# Patient Record
Sex: MALE | Race: WHITE | NOT HISPANIC OR LATINO | Employment: FULL TIME | URBAN - METROPOLITAN AREA
[De-identification: names, ages, dates, MRNs, and addresses within clinical notes are randomized per-mention and may not be internally consistent; named-entity substitution may affect disease eponyms.]

---

## 2017-02-09 ENCOUNTER — HOSPITAL ENCOUNTER (EMERGENCY)
Facility: HOSPITAL | Age: 27
Discharge: HOME | End: 2017-02-09
Attending: EMERGENCY MEDICINE | Admitting: EMERGENCY MEDICINE

## 2017-02-09 ENCOUNTER — APPOINTMENT (OUTPATIENT)
Dept: CT IMAGING | Facility: HOSPITAL | Age: 27
End: 2017-02-09

## 2017-02-09 VITALS
HEART RATE: 85 BPM | HEIGHT: 73 IN | TEMPERATURE: 96.6 F | RESPIRATION RATE: 16 BRPM | WEIGHT: 150 LBS | OXYGEN SATURATION: 100 % | DIASTOLIC BLOOD PRESSURE: 99 MMHG | BODY MASS INDEX: 19.88 KG/M2 | SYSTOLIC BLOOD PRESSURE: 152 MMHG

## 2017-02-09 DIAGNOSIS — N20.1 LEFT URETERAL STONE: Primary | ICD-10-CM

## 2017-02-09 DIAGNOSIS — R73.9 HYPERGLYCEMIA: ICD-10-CM

## 2017-02-09 LAB
ALBUMIN SERPL-MCNC: 4.7 G/DL (ref 3.5–5.2)
ALBUMIN/GLOB SERPL: 1.9 G/DL
ALP SERPL-CCNC: 49 U/L (ref 39–117)
ALT SERPL W P-5'-P-CCNC: 17 U/L (ref 1–41)
ANION GAP SERPL CALCULATED.3IONS-SCNC: 13.9 MMOL/L
AST SERPL-CCNC: 17 U/L (ref 1–40)
BACTERIA UR QL AUTO: ABNORMAL /HPF
BASOPHILS # BLD AUTO: 0.03 10*3/MM3 (ref 0–0.2)
BASOPHILS NFR BLD AUTO: 0.2 % (ref 0–1.5)
BILIRUB SERPL-MCNC: 0.6 MG/DL (ref 0.1–1.2)
BILIRUB UR QL STRIP: NEGATIVE
BUN BLD-MCNC: 13 MG/DL (ref 6–20)
BUN/CREAT SERPL: 12.6 (ref 7–25)
CALCIUM SPEC-SCNC: 9.4 MG/DL (ref 8.6–10.5)
CHLORIDE SERPL-SCNC: 101 MMOL/L (ref 98–107)
CLARITY UR: ABNORMAL
CO2 SERPL-SCNC: 25.1 MMOL/L (ref 22–29)
COD CRY URNS QL: ABNORMAL /HPF
COLOR UR: ABNORMAL
CREAT BLD-MCNC: 1.03 MG/DL (ref 0.76–1.27)
DEPRECATED RDW RBC AUTO: 41.9 FL (ref 37–54)
EOSINOPHIL # BLD AUTO: 0.08 10*3/MM3 (ref 0–0.7)
EOSINOPHIL NFR BLD AUTO: 0.6 % (ref 0.3–6.2)
ERYTHROCYTE [DISTWIDTH] IN BLOOD BY AUTOMATED COUNT: 12.9 % (ref 11.5–14.5)
GFR SERPL CREATININE-BSD FRML MDRD: 88 ML/MIN/1.73
GLOBULIN UR ELPH-MCNC: 2.5 GM/DL
GLUCOSE BLD-MCNC: 175 MG/DL (ref 65–99)
GLUCOSE UR STRIP-MCNC: NEGATIVE MG/DL
HCT VFR BLD AUTO: 43.8 % (ref 40.4–52.2)
HGB BLD-MCNC: 15.3 G/DL (ref 13.7–17.6)
HGB UR QL STRIP.AUTO: ABNORMAL
HOLD SPECIMEN: NORMAL
HOLD SPECIMEN: NORMAL
HYALINE CASTS UR QL AUTO: ABNORMAL /LPF
IMM GRANULOCYTES # BLD: 0.05 10*3/MM3 (ref 0–0.03)
IMM GRANULOCYTES NFR BLD: 0.4 % (ref 0–0.5)
KETONES UR QL STRIP: ABNORMAL
LEUKOCYTE ESTERASE UR QL STRIP.AUTO: NEGATIVE
LIPASE SERPL-CCNC: 19 U/L (ref 13–60)
LYMPHOCYTES # BLD AUTO: 2.37 10*3/MM3 (ref 0.9–4.8)
LYMPHOCYTES NFR BLD AUTO: 18.2 % (ref 19.6–45.3)
MCH RBC QN AUTO: 31 PG (ref 27–32.7)
MCHC RBC AUTO-ENTMCNC: 34.9 G/DL (ref 32.6–36.4)
MCV RBC AUTO: 88.8 FL (ref 79.8–96.2)
MONOCYTES # BLD AUTO: 0.92 10*3/MM3 (ref 0.2–1.2)
MONOCYTES NFR BLD AUTO: 7.1 % (ref 5–12)
MUCOUS THREADS URNS QL MICRO: ABNORMAL /HPF
NEUTROPHILS # BLD AUTO: 9.57 10*3/MM3 (ref 1.9–8.1)
NEUTROPHILS NFR BLD AUTO: 73.5 % (ref 42.7–76)
NITRITE UR QL STRIP: NEGATIVE
PH UR STRIP.AUTO: 6 [PH] (ref 5–8)
PLATELET # BLD AUTO: 178 10*3/MM3 (ref 140–500)
PMV BLD AUTO: 10.1 FL (ref 6–12)
POTASSIUM BLD-SCNC: 3.8 MMOL/L (ref 3.5–5.2)
PROT SERPL-MCNC: 7.2 G/DL (ref 6–8.5)
PROT UR QL STRIP: ABNORMAL
RBC # BLD AUTO: 4.93 10*6/MM3 (ref 4.6–6)
RBC # UR: ABNORMAL /HPF
REF LAB TEST METHOD: ABNORMAL
SODIUM BLD-SCNC: 140 MMOL/L (ref 136–145)
SP GR UR STRIP: >=1.03 (ref 1–1.03)
SQUAMOUS #/AREA URNS HPF: ABNORMAL /HPF
UROBILINOGEN UR QL STRIP: ABNORMAL
WBC NRBC COR # BLD: 13.02 10*3/MM3 (ref 4.5–10.7)
WBC UR QL AUTO: ABNORMAL /HPF
WHOLE BLOOD HOLD SPECIMEN: NORMAL
WHOLE BLOOD HOLD SPECIMEN: NORMAL

## 2017-02-09 PROCEDURE — 25010000002 ONDANSETRON PER 1 MG: Performed by: EMERGENCY MEDICINE

## 2017-02-09 PROCEDURE — 96361 HYDRATE IV INFUSION ADD-ON: CPT

## 2017-02-09 PROCEDURE — 87086 URINE CULTURE/COLONY COUNT: CPT | Performed by: EMERGENCY MEDICINE

## 2017-02-09 PROCEDURE — 0 IOPAMIDOL 61 % SOLUTION: Performed by: EMERGENCY MEDICINE

## 2017-02-09 PROCEDURE — 96375 TX/PRO/DX INJ NEW DRUG ADDON: CPT

## 2017-02-09 PROCEDURE — 81001 URINALYSIS AUTO W/SCOPE: CPT | Performed by: EMERGENCY MEDICINE

## 2017-02-09 PROCEDURE — 25010000002 MORPHINE PER 10 MG: Performed by: EMERGENCY MEDICINE

## 2017-02-09 PROCEDURE — 83690 ASSAY OF LIPASE: CPT | Performed by: EMERGENCY MEDICINE

## 2017-02-09 PROCEDURE — 85025 COMPLETE CBC W/AUTO DIFF WBC: CPT | Performed by: EMERGENCY MEDICINE

## 2017-02-09 PROCEDURE — 96374 THER/PROPH/DIAG INJ IV PUSH: CPT

## 2017-02-09 PROCEDURE — 74177 CT ABD & PELVIS W/CONTRAST: CPT

## 2017-02-09 PROCEDURE — 80053 COMPREHEN METABOLIC PANEL: CPT | Performed by: EMERGENCY MEDICINE

## 2017-02-09 PROCEDURE — 99283 EMERGENCY DEPT VISIT LOW MDM: CPT

## 2017-02-09 RX ORDER — OXYCODONE HYDROCHLORIDE AND ACETAMINOPHEN 5; 325 MG/1; MG/1
1 TABLET ORAL EVERY 4 HOURS PRN
Qty: 20 TABLET | Refills: 0 | Status: SHIPPED | OUTPATIENT
Start: 2017-02-09 | End: 2020-07-04

## 2017-02-09 RX ORDER — ONDANSETRON 2 MG/ML
4 INJECTION INTRAMUSCULAR; INTRAVENOUS ONCE
Status: COMPLETED | OUTPATIENT
Start: 2017-02-09 | End: 2017-02-09

## 2017-02-09 RX ORDER — SODIUM CHLORIDE 0.9 % (FLUSH) 0.9 %
10 SYRINGE (ML) INJECTION AS NEEDED
Status: DISCONTINUED | OUTPATIENT
Start: 2017-02-09 | End: 2017-02-09 | Stop reason: HOSPADM

## 2017-02-09 RX ORDER — ONDANSETRON 4 MG/1
4 TABLET, FILM COATED ORAL EVERY 6 HOURS
Qty: 12 TABLET | Refills: 0 | Status: SHIPPED | OUTPATIENT
Start: 2017-02-09 | End: 2020-07-04

## 2017-02-09 RX ORDER — TAMSULOSIN HYDROCHLORIDE 0.4 MG/1
1 CAPSULE ORAL NIGHTLY
Qty: 5 CAPSULE | Refills: 0 | Status: SHIPPED | OUTPATIENT
Start: 2017-02-09 | End: 2020-07-04

## 2017-02-09 RX ADMIN — ONDANSETRON 4 MG: 2 INJECTION INTRAMUSCULAR; INTRAVENOUS at 10:05

## 2017-02-09 RX ADMIN — IOPAMIDOL 85 ML: 612 INJECTION, SOLUTION INTRAVENOUS at 10:33

## 2017-02-09 RX ADMIN — MORPHINE SULFATE 4 MG: 4 INJECTION, SOLUTION INTRAMUSCULAR; INTRAVENOUS at 10:06

## 2017-02-09 RX ADMIN — SODIUM CHLORIDE 1000 ML: 9 INJECTION, SOLUTION INTRAVENOUS at 10:04

## 2017-02-09 NOTE — ED NOTES
Patient reports that he awoke this morning with sharp left lower abdomen.  Patient has vomited three times and is now dry heaving.  Denies  Dysuria.       Leslie Escobar RN  02/09/17 3007

## 2017-02-10 LAB — BACTERIA SPEC AEROBE CULT: NO GROWTH

## 2017-11-27 NOTE — ED PROVIDER NOTES
EMERGENCY DEPARTMENT ENCOUNTER    CHIEF COMPLAINT  Chief Complaint: abd pain  History given by: patient  History limited by: nothing  Room Number: 30/30  PMD: No Known Provider      HPI:  Pt is a 26 y.o. male who presents complaining of constant sharp L-sided abd pain that woke him from sleeping this morning. Pt c/o subjective fever, chills, vomiting (x3-4) but denies constipation, diarrhea, dysuria, back pain, sick contacts.    Duration:  Onset this morning  Onset: gradual  Timing: constant  Location: L sided abd  Radiation: none  Quality: sharp  Intensity/Severity: moderate  Progression: unchanged  Associated Symptoms: subjective fever, chills, vomiting  Aggravating Factors: none  Alleviating Factors: none  Previous Episodes: none  Treatment before arrival: none    PAST MEDICAL HISTORY  Active Ambulatory Problems     Diagnosis Date Noted   • No Active Ambulatory Problems     Resolved Ambulatory Problems     Diagnosis Date Noted   • No Resolved Ambulatory Problems     No Additional Past Medical History       PAST SURGICAL HISTORY  History reviewed. No pertinent past surgical history.    FAMILY HISTORY  History reviewed. No pertinent family history.    SOCIAL HISTORY  Social History     Social History   • Marital status: Single     Spouse name: N/A   • Number of children: N/A   • Years of education: N/A     Occupational History   • Not on file.     Social History Main Topics   • Smoking status: Current Every Day Smoker     Types: Cigarettes   • Smokeless tobacco: Not on file   • Alcohol use 0.6 oz/week     1 Cans of beer per week   • Drug use: No   • Sexual activity: Yes     Birth control/ protection: Condom     Other Topics Concern   • Not on file     Social History Narrative   • No narrative on file       ALLERGIES  Review of patient's allergies indicates no known allergies.    REVIEW OF SYSTEMS  Review of Systems   Constitutional: Positive for chills and fever (subjective).   HENT: Negative for congestion and  sore throat.    Eyes: Negative.    Respiratory: Negative for cough and shortness of breath.    Cardiovascular: Negative for chest pain and leg swelling.   Gastrointestinal: Positive for abdominal pain (L sided) and vomiting (x3-4). Negative for diarrhea.   Genitourinary: Negative for difficulty urinating and dysuria.   Musculoskeletal: Negative for back pain and neck pain.   Skin: Negative for rash and wound.   Allergic/Immunologic: Negative.    Neurological: Negative for dizziness, weakness, numbness and headaches.   Psychiatric/Behavioral: Negative.    All other systems reviewed and are negative.      PHYSICAL EXAM  ED Triage Vitals   Temp Heart Rate Resp BP SpO2   02/09/17 0833 02/09/17 0833 02/09/17 0833 02/09/17 0833 02/09/17 0833   96.6 °F (35.9 °C) 92 20 159/90 99 %      Temp src Heart Rate Source Patient Position BP Location FiO2 (%)   02/09/17 0833 02/09/17 0833 02/09/17 0833 02/09/17 0833 --   Tympanic Monitor Sitting Right arm        Physical Exam   Constitutional: He is oriented to person, place, and time and well-developed, well-nourished, and in no distress.   HENT:   Head: Normocephalic and atraumatic.   Eyes: EOM are normal. Pupils are equal, round, and reactive to light.   Neck: Normal range of motion. Neck supple.   Cardiovascular: Normal rate, regular rhythm and normal heart sounds.    Pulmonary/Chest: Effort normal and breath sounds normal. No respiratory distress.   Abdominal: Soft. There is tenderness (LLQ). There is no rebound, no guarding and no CVA tenderness.   Musculoskeletal: Normal range of motion. He exhibits no edema.   Neurological: He is alert and oriented to person, place, and time. He has normal sensation and normal strength.   Skin: Skin is warm and dry.   Psychiatric: Mood and affect normal.   Nursing note and vitals reviewed.      LAB RESULTS  Lab Results (last 24 hours)     Procedure Component Value Units Date/Time    CBC & Differential [[id number]] Collected:  02/09/17 0901     Specimen:  Blood Updated:  02/09/17 0915    Narrative:       The following orders were created for panel order CBC & Differential.  Procedure                               Abnormality         Status                     ---------                               -----------         ------                     CBC Auto Differential[[id number]]         Abnormal            Final result                 Please view results for these tests on the individual orders.    Comprehensive Metabolic Panel [[id number]]  (Abnormal) Collected:  02/09/17 0901    Specimen:  Blood Updated:  02/09/17 0934     Glucose 175 (H) mg/dL      BUN 13 mg/dL      Creatinine 1.03 mg/dL      Sodium 140 mmol/L      Potassium 3.8 mmol/L      Chloride 101 mmol/L      CO2 25.1 mmol/L      Calcium 9.4 mg/dL      Total Protein 7.2 g/dL      Albumin 4.70 g/dL      ALT (SGPT) 17 U/L      AST (SGOT) 17 U/L      Alkaline Phosphatase 49 U/L      Total Bilirubin 0.6 mg/dL      eGFR Non African Amer 88 mL/min/1.73      Globulin 2.5 gm/dL      A/G Ratio 1.9 g/dL      BUN/Creatinine Ratio 12.6      Anion Gap 13.9 mmol/L     Lipase [[id number]]  (Normal) Collected:  02/09/17 0901    Specimen:  Blood Updated:  02/09/17 0934     Lipase 19 U/L     CBC Auto Differential [[id number]]  (Abnormal) Collected:  02/09/17 0901    Specimen:  Blood Updated:  02/09/17 0915     WBC 13.02 (H) 10*3/mm3      RBC 4.93 10*6/mm3      Hemoglobin 15.3 g/dL      Hematocrit 43.8 %      MCV 88.8 fL      MCH 31.0 pg      MCHC 34.9 g/dL      RDW 12.9 %      RDW-SD 41.9 fl      MPV 10.1 fL      Platelets 178 10*3/mm3      Neutrophil % 73.5 %      Lymphocyte % 18.2 (L) %      Monocyte % 7.1 %      Eosinophil % 0.6 %      Basophil % 0.2 %      Immature Grans % 0.4 %      Neutrophils, Absolute 9.57 (H) 10*3/mm3      Lymphocytes, Absolute 2.37 10*3/mm3      Monocytes, Absolute 0.92 10*3/mm3      Eosinophils, Absolute 0.08 10*3/mm3      Basophils, Absolute 0.03 10*3/mm3      Immature Grans, Absolute 0.05 (H)  10*3/mm3     Urinalysis With / Culture If Indicated [[id number]]  (Abnormal) Collected:  02/09/17 0917    Specimen:  Urine from Urine, Clean Catch Updated:  02/09/17 1006     Color, UA Dark Yellow (A)      Appearance, UA Cloudy (A)      pH, UA 6.0      Specific Gravity, UA >=1.030      Glucose, UA Negative      Ketones, UA 15 mg/dL (1+) (A)      Bilirubin, UA Negative      Blood, UA Moderate (2+) (A)      Protein, UA 30 mg/dL (1+) (A)      Leuk Esterase, UA Negative      Nitrite, UA Negative      Urobilinogen, UA 1.0 E.U./dL     Urinalysis, Microscopic Only [[id number]]  (Abnormal) Collected:  02/09/17 0917    Specimen:  Urine from Urine, Clean Catch Updated:  02/09/17 1006     RBC, UA 3-5 (A) /HPF      WBC, UA 3-5 (A) /HPF      Bacteria, UA 1+ (A) /HPF      Squamous Epithelial Cells, UA 0-2 /HPF      Hyaline Casts, UA None Seen /LPF      Calcium Oxalate Crystals, UA Moderate/2+ /HPF      Mucus, UA Moderate/2+ (A) /HPF      Methodology Manual Light Microscopy     Urine Culture [[id number]] Collected:  02/09/17 0917    Specimen:  Urine from Urine, Clean Catch Updated:  02/09/17 0946        I ordered the above labs and reviewed the results    RADIOLOGY  CT Abdomen Pelvis With Contrast    (Results Pending)   CT abd/pel shows 3 mm L sided ureteral stone w/ moderate obstruction in distal L ureter.    I ordered the above noted radiological studies. Interpreted by radiologist. Discussed with radiologist (Dr. Romo). Reviewed by me in PACS.       PROCEDURES  Procedures      PROGRESS AND CONSULTS  ED Course     0950  Ordered CT abd/pel for further evaluation. Ordered morphine for pain and Zofran for nausea.    1052  Rechecked pt who is resting comfortably. D/w pt CT abd/pel that shows 3 mm stone in L ureter and plan to d/c w/ pain and nausea medication and f/u w/ urologist. Gave RTER warnings. Pt and family understand and agree with the plan. All questions answered.    MEDICAL DECISION MAKING  Results were reviewed/discussed  with the patient and they were also made aware of online access. Pt also made aware that some labs, such as cultures, will not be resulted during ER visit and follow up with PMD is necessary.     MDM  Number of Diagnoses or Management Options  Left ureteral stone:      Amount and/or Complexity of Data Reviewed  Clinical lab tests: ordered and reviewed (UA shows 3-5 RBC.)  Tests in the radiology section of CPT®: ordered and reviewed (CT abd/pel shows 3 mm L sided ureteral stone w/ moderate obstruction in distal L ureter.)  Decide to obtain previous medical records or to obtain history from someone other than the patient: yes  Review and summarize past medical records: yes  Discuss the patient with other providers: yes (Dr. Romo, radiology)  Independent visualization of images, tracings, or specimens: yes    Patient Progress  Patient progress: stable         DIAGNOSIS  Final diagnoses:   Left ureteral stone   Hyperglycemia       DISPOSITION  DISCHARGE    Patient discharged in stable condition.    Reviewed implications of results, diagnosis, meds, responsibility to follow up, warning signs and symptoms of possible worsening, potential complications and reasons to return to ER, including worsening abd or flank pain.    Patient/Family voiced understanding of above instructions.    Discussed plan for discharge, as there is no emergent indication for admission.  Pt/family is agreeable and understands need for follow up and repeat testing.  Pt is aware that discharge does not mean that nothing is wrong but it indicates no emergency is present that requires admission and they must continue care with follow-up as given below or physician of their choice.     FOLLOW-UP  Andrey Barker MD  [address] [place] [address] [place] [address]  [phone]    Call in 1 day  For follow-up    [facility]YS[facility] REFERR[facility]  [place] [id number] [phone]    blood sugar recheck       Medication List       New Prescriptions          ondansetron 4 MG tablet   Commonly known as:  ZOFRAN   Take 1 tablet by mouth Every 6 (Six) Hours.       oxyCODONE-acetaminophen 5-325 MG per tablet   Commonly known as:  PERCOCET   Take 1 tablet by mouth Every 4 (Four) Hours As Needed for severe pain   (7-10).       tamsulosin 0.4 MG capsule 24 hr capsule   Commonly known as:  FLOMAX   Take 1 capsule by mouth Every Night.         Latest Documented Vital Signs:  As of 11:25 AM  BP- 152/99 HR- 85 Temp- 96.6 °F (35.9 °C) (Tympanic) O2 sat- 100%    --  Documentation assistance provided by rao Brink for Dr. López.  Information recorded by the scribe was done at my direction and has been verified and validated by me.     Deondre Brink  02/09/17 3032       Scottie López MD  02/09/17 2331     Daily Assessment

## 2020-07-04 ENCOUNTER — HOSPITAL ENCOUNTER (EMERGENCY)
Facility: HOSPITAL | Age: 30
Discharge: HOME | End: 2020-07-04
Attending: EMERGENCY MEDICINE | Admitting: EMERGENCY MEDICINE

## 2020-07-04 VITALS
DIASTOLIC BLOOD PRESSURE: 92 MMHG | TEMPERATURE: 96.8 F | SYSTOLIC BLOOD PRESSURE: 147 MMHG | HEART RATE: 96 BPM | WEIGHT: 215 LBS | RESPIRATION RATE: 16 BRPM | BODY MASS INDEX: 28.49 KG/M2 | HEIGHT: 73 IN | OXYGEN SATURATION: 97 %

## 2020-07-04 DIAGNOSIS — E86.0 DEHYDRATION, MILD: ICD-10-CM

## 2020-07-04 DIAGNOSIS — S80.819A ABRASION OF LOWER EXTREMITY, UNSPECIFIED LATERALITY, INITIAL ENCOUNTER: ICD-10-CM

## 2020-07-04 DIAGNOSIS — S30.811A ABRASION OF FLANK, INITIAL ENCOUNTER: Primary | ICD-10-CM

## 2020-07-04 LAB
BILIRUB UR QL STRIP: NEGATIVE
CLARITY UR: CLEAR
COLOR UR: YELLOW
GLUCOSE UR STRIP-MCNC: NEGATIVE MG/DL
HGB UR QL STRIP.AUTO: NEGATIVE
KETONES UR QL STRIP: ABNORMAL
LEUKOCYTE ESTERASE UR QL STRIP.AUTO: NEGATIVE
NITRITE UR QL STRIP: NEGATIVE
PH UR STRIP.AUTO: 5.5 [PH] (ref 5–8)
PROT UR QL STRIP: NEGATIVE
SP GR UR STRIP: >=1.03 (ref 1–1.03)
UROBILINOGEN UR QL STRIP: ABNORMAL

## 2020-07-04 PROCEDURE — 81003 URINALYSIS AUTO W/O SCOPE: CPT | Performed by: EMERGENCY MEDICINE

## 2020-07-04 PROCEDURE — 99284 EMERGENCY DEPT VISIT MOD MDM: CPT

## 2020-07-04 RX ORDER — ACETAMINOPHEN 500 MG
1000 TABLET ORAL ONCE
Status: COMPLETED | OUTPATIENT
Start: 2020-07-04 | End: 2020-07-04

## 2020-07-04 RX ADMIN — ACETAMINOPHEN 1000 MG: 500 TABLET, FILM COATED ORAL at 11:35
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
## 2020-07-04 NOTE — DISCHARGE INSTRUCTIONS
Expect be stiff and sore for several days up to 1 to 2 weeks, drink plenty of water, Tylenol and ibuprofen for pain as needed, ice for any swelling or localized pain, heat for stiffness and soreness, return to the emergency department for worsening symptoms as needed.